# Patient Record
Sex: FEMALE | Race: WHITE | NOT HISPANIC OR LATINO | URBAN - METROPOLITAN AREA
[De-identification: names, ages, dates, MRNs, and addresses within clinical notes are randomized per-mention and may not be internally consistent; named-entity substitution may affect disease eponyms.]

---

## 2018-10-11 ENCOUNTER — EMERGENCY (EMERGENCY)
Facility: HOSPITAL | Age: 63
LOS: 1 days | Discharge: HOME | End: 2018-10-11
Attending: EMERGENCY MEDICINE | Admitting: EMERGENCY MEDICINE
Payer: COMMERCIAL

## 2018-10-11 VITALS
OXYGEN SATURATION: 100 % | SYSTOLIC BLOOD PRESSURE: 120 MMHG | HEART RATE: 73 BPM | RESPIRATION RATE: 16 BRPM | TEMPERATURE: 99 F | DIASTOLIC BLOOD PRESSURE: 64 MMHG

## 2018-10-11 VITALS
HEART RATE: 83 BPM | RESPIRATION RATE: 18 BRPM | OXYGEN SATURATION: 100 % | TEMPERATURE: 99 F | SYSTOLIC BLOOD PRESSURE: 135 MMHG | DIASTOLIC BLOOD PRESSURE: 77 MMHG

## 2018-10-11 DIAGNOSIS — H26.9 UNSPECIFIED CATARACT: Chronic | ICD-10-CM

## 2018-10-11 LAB
ALBUMIN SERPL ELPH-MCNC: 4.4 G/DL — SIGNIFICANT CHANGE UP (ref 3.3–5)
ALP SERPL-CCNC: 59 U/L — SIGNIFICANT CHANGE UP (ref 40–120)
ALT FLD-CCNC: 16 U/L — SIGNIFICANT CHANGE UP (ref 4–33)
APPEARANCE UR: CLEAR — SIGNIFICANT CHANGE UP
AST SERPL-CCNC: 19 U/L — SIGNIFICANT CHANGE UP (ref 4–32)
BACTERIA # UR AUTO: NEGATIVE — SIGNIFICANT CHANGE UP
BASOPHILS # BLD AUTO: 0.04 K/UL — SIGNIFICANT CHANGE UP (ref 0–0.2)
BASOPHILS NFR BLD AUTO: 0.5 % — SIGNIFICANT CHANGE UP (ref 0–2)
BILIRUB SERPL-MCNC: 0.8 MG/DL — SIGNIFICANT CHANGE UP (ref 0.2–1.2)
BILIRUB UR-MCNC: NEGATIVE — SIGNIFICANT CHANGE UP
BLOOD UR QL VISUAL: HIGH
BUN SERPL-MCNC: 15 MG/DL — SIGNIFICANT CHANGE UP (ref 7–23)
CALCIUM SERPL-MCNC: 8.8 MG/DL — SIGNIFICANT CHANGE UP (ref 8.4–10.5)
CHLORIDE SERPL-SCNC: 97 MMOL/L — LOW (ref 98–107)
CO2 SERPL-SCNC: 23 MMOL/L — SIGNIFICANT CHANGE UP (ref 22–31)
COLOR SPEC: SIGNIFICANT CHANGE UP
CREAT SERPL-MCNC: 1.2 MG/DL — SIGNIFICANT CHANGE UP (ref 0.5–1.3)
EOSINOPHIL # BLD AUTO: 0.1 K/UL — SIGNIFICANT CHANGE UP (ref 0–0.5)
EOSINOPHIL NFR BLD AUTO: 1.2 % — SIGNIFICANT CHANGE UP (ref 0–6)
GLUCOSE SERPL-MCNC: 157 MG/DL — HIGH (ref 70–99)
GLUCOSE UR-MCNC: NEGATIVE — SIGNIFICANT CHANGE UP
HCT VFR BLD CALC: 38.4 % — SIGNIFICANT CHANGE UP (ref 34.5–45)
HGB BLD-MCNC: 13.1 G/DL — SIGNIFICANT CHANGE UP (ref 11.5–15.5)
HYALINE CASTS # UR AUTO: NEGATIVE — SIGNIFICANT CHANGE UP
IMM GRANULOCYTES # BLD AUTO: 0.06 # — SIGNIFICANT CHANGE UP
IMM GRANULOCYTES NFR BLD AUTO: 0.7 % — SIGNIFICANT CHANGE UP (ref 0–1.5)
KETONES UR-MCNC: SIGNIFICANT CHANGE UP
LEUKOCYTE ESTERASE UR-ACNC: NEGATIVE — SIGNIFICANT CHANGE UP
LYMPHOCYTES # BLD AUTO: 1.2 K/UL — SIGNIFICANT CHANGE UP (ref 1–3.3)
LYMPHOCYTES # BLD AUTO: 14.5 % — SIGNIFICANT CHANGE UP (ref 13–44)
MCHC RBC-ENTMCNC: 29.4 PG — SIGNIFICANT CHANGE UP (ref 27–34)
MCHC RBC-ENTMCNC: 34.1 % — SIGNIFICANT CHANGE UP (ref 32–36)
MCV RBC AUTO: 86.1 FL — SIGNIFICANT CHANGE UP (ref 80–100)
MONOCYTES # BLD AUTO: 0.78 K/UL — SIGNIFICANT CHANGE UP (ref 0–0.9)
MONOCYTES NFR BLD AUTO: 9.4 % — SIGNIFICANT CHANGE UP (ref 2–14)
NEUTROPHILS # BLD AUTO: 6.1 K/UL — SIGNIFICANT CHANGE UP (ref 1.8–7.4)
NEUTROPHILS NFR BLD AUTO: 73.7 % — SIGNIFICANT CHANGE UP (ref 43–77)
NITRITE UR-MCNC: NEGATIVE — SIGNIFICANT CHANGE UP
NRBC # FLD: 0 — SIGNIFICANT CHANGE UP
PH UR: 6.5 — SIGNIFICANT CHANGE UP (ref 5–8)
PLATELET # BLD AUTO: 187 K/UL — SIGNIFICANT CHANGE UP (ref 150–400)
PMV BLD: 10.4 FL — SIGNIFICANT CHANGE UP (ref 7–13)
POTASSIUM SERPL-MCNC: 3.8 MMOL/L — SIGNIFICANT CHANGE UP (ref 3.5–5.3)
POTASSIUM SERPL-SCNC: 3.8 MMOL/L — SIGNIFICANT CHANGE UP (ref 3.5–5.3)
PROT SERPL-MCNC: 6.8 G/DL — SIGNIFICANT CHANGE UP (ref 6–8.3)
PROT UR-MCNC: NEGATIVE — SIGNIFICANT CHANGE UP
RBC # BLD: 4.46 M/UL — SIGNIFICANT CHANGE UP (ref 3.8–5.2)
RBC # FLD: 12.8 % — SIGNIFICANT CHANGE UP (ref 10.3–14.5)
RBC CASTS # UR COMP ASSIST: HIGH (ref 0–?)
SODIUM SERPL-SCNC: 136 MMOL/L — SIGNIFICANT CHANGE UP (ref 135–145)
SP GR SPEC: 1.01 — SIGNIFICANT CHANGE UP (ref 1–1.04)
SQUAMOUS # UR AUTO: SIGNIFICANT CHANGE UP
UROBILINOGEN FLD QL: NORMAL — SIGNIFICANT CHANGE UP
WBC # BLD: 8.28 K/UL — SIGNIFICANT CHANGE UP (ref 3.8–10.5)
WBC # FLD AUTO: 8.28 K/UL — SIGNIFICANT CHANGE UP (ref 3.8–10.5)
WBC UR QL: SIGNIFICANT CHANGE UP (ref 0–?)

## 2018-10-11 PROCEDURE — 99284 EMERGENCY DEPT VISIT MOD MDM: CPT | Mod: 25

## 2018-10-11 PROCEDURE — 74176 CT ABD & PELVIS W/O CONTRAST: CPT | Mod: 26

## 2018-10-11 RX ORDER — SODIUM CHLORIDE 9 MG/ML
1000 INJECTION INTRAMUSCULAR; INTRAVENOUS; SUBCUTANEOUS ONCE
Qty: 0 | Refills: 0 | Status: COMPLETED | OUTPATIENT
Start: 2018-10-11 | End: 2018-10-11

## 2018-10-11 RX ORDER — TAMSULOSIN HYDROCHLORIDE 0.4 MG/1
1 CAPSULE ORAL
Qty: 7 | Refills: 0 | OUTPATIENT
Start: 2018-10-11 | End: 2018-10-17

## 2018-10-11 RX ORDER — IBUPROFEN 200 MG
1 TABLET ORAL
Qty: 28 | Refills: 0 | OUTPATIENT
Start: 2018-10-11 | End: 2018-10-17

## 2018-10-11 RX ORDER — KETOROLAC TROMETHAMINE 30 MG/ML
15 SYRINGE (ML) INJECTION ONCE
Qty: 0 | Refills: 0 | Status: DISCONTINUED | OUTPATIENT
Start: 2018-10-11 | End: 2018-10-11

## 2018-10-11 RX ORDER — ACETAMINOPHEN 500 MG
975 TABLET ORAL ONCE
Qty: 0 | Refills: 0 | Status: COMPLETED | OUTPATIENT
Start: 2018-10-11 | End: 2018-10-11

## 2018-10-11 RX ADMIN — Medication 15 MILLIGRAM(S): at 04:59

## 2018-10-11 RX ADMIN — Medication 975 MILLIGRAM(S): at 08:47

## 2018-10-11 RX ADMIN — SODIUM CHLORIDE 1000 MILLILITER(S): 9 INJECTION INTRAMUSCULAR; INTRAVENOUS; SUBCUTANEOUS at 06:01

## 2018-10-11 RX ADMIN — SODIUM CHLORIDE 1000 MILLILITER(S): 9 INJECTION INTRAMUSCULAR; INTRAVENOUS; SUBCUTANEOUS at 05:00

## 2018-10-11 RX ADMIN — Medication 15 MILLIGRAM(S): at 06:01

## 2018-10-11 NOTE — ED ADULT NURSE NOTE - OBJECTIVE STATEMENT
Pt presents to  3, A&Ox4, ambulatory at baseline w/o assistance, pmhx of arthritis, borderline DM, here for evaluation of intermittent left sided flank pain that radiates to LLQ, hematuria noticed x2 this week noticed x4days ago, no hematuria since 2 days ago, no bleeding/ discharge or dysuria at this time. Pt appears a mildly uncomfortable bc of pain, VSS, pt stable. Non tender abdomen and back on palpation. Denies chest pain, shortness of breath, palpitations, diaphoresis, headaches, fevers, dizziness, nausea, vomiting, diarrhea, or urinary symptoms at this time. IV established in left AC with a 20G, labs drawn and sent, call bell in reach, warm blanket provided, bed in lowest position, side rails up x2. Will continue to monitor.

## 2018-10-11 NOTE — ED ADULT NURSE NOTE - CHIEF COMPLAINT QUOTE
Pt st" I noticed blood in urine on Sunday....which went away but returned on Tuesday...I saw a urologist did quick bedside scan saw nothing need to schedule a CT scan I was told I have no infection...but tonight I woke with severe left side flank pain." Pt pleasant smiling affect...yet intermittenly uncomfortable... restless. Denies nausea.

## 2018-10-11 NOTE — ED PROVIDER NOTE - CARE PLAN
Principal Discharge DX:	Nephrolithiasis  Assessment and plan of treatment:	Your diagnosis: nephrolithiasis    Discharge instructions:    1. Please follow-up with your urologist next week at predetermined appointment date and location.    2. Take any prescribed medications as instructed: Take Ibuprofen 600 mg every 6 hours as needed for pain, and take Flomax 0.4mg once a day at night.    3. Others:    4. Be sure to return to the ED if you develop new or worsening symptoms. Specific signs and symptoms to be vigilant of: fever or chills, pain on urination, blood in the urine, cloudy urine, foul-smelling urine, increased frequency of urination, difficulty with urination, back pain, abdominal or pelvic pain.

## 2018-10-11 NOTE — ED PROVIDER NOTE - PROGRESS NOTE DETAILS
PgAlexei matt: patient reassessed, pain improved since coming into the ED. Patient has follow up appointment with her urologist next week. Will d/c on Ibuprofen and Tamsulosin. Return precautions discussed and patient comfortable going home.

## 2018-10-11 NOTE — ED ADULT TRIAGE NOTE - CHIEF COMPLAINT QUOTE
Pt st" I noticed blood in urine on Sunday....which went away but returned on Tuesday...I saw a urologist did quick bedside scan saw nothing need to schedule a CT scan I was told I have no infection...but tonight I woke with severe left side flank pain." Pt appears uncomfortable, restless. Pt st" I noticed blood in urine on Sunday....which went away but returned on Tuesday...I saw a urologist did quick bedside scan saw nothing need to schedule a CT scan I was told I have no infection...but tonight I woke with severe left side flank pain." Pt pleasant smiling affect...yet intermittenly uncomfortable... restless. Denies nausea.

## 2018-10-11 NOTE — ED PROVIDER NOTE - PLAN OF CARE
Your diagnosis: nephrolithiasis    Discharge instructions:    1. Please follow-up with your urologist next week at predetermined appointment date and location.    2. Take any prescribed medications as instructed: Take Ibuprofen 600 mg every 6 hours as needed for pain, and take Flomax 0.4mg once a day at night.    3. Others:    4. Be sure to return to the ED if you develop new or worsening symptoms. Specific signs and symptoms to be vigilant of: fever or chills, pain on urination, blood in the urine, cloudy urine, foul-smelling urine, increased frequency of urination, difficulty with urination, back pain, abdominal or pelvic pain.

## 2018-10-11 NOTE — ED PROVIDER NOTE - MEDICAL DECISION MAKING DETAILS
Flank pain most c/w nephrolithiasis (Other DDx also includes AAA,  infection & masses, hernia, GI surgical pathology)  Plan: 1) LABS/UA/IVF.  2) CT abd/pelvis - stone hunt (for first time) vs US.  3) pain control & anti-emetics as needed.  4) reassess.  5) urologic consultation if necessary.

## 2018-10-11 NOTE — ED PROVIDER NOTE - ATTENDING CONTRIBUTION TO CARE
63 y/o otherwise healthy F with 4 days of hematuria and left flank pain since 12am tonight.  Pt saw her PMD yesterday for hematuria and urinary frequency and subsequently referred to a urologist.  She is scheduled for outpatient CT tomorrow, but tonight after going to bed started to have left back and flank pain.  No fever, chills, abd pain, n/v/c/d, dysuria.  Well appearing, sitting in stretcher, awake and alert, nontoxic.  AF/VSS.  Lungs cta bl.  Cards nl S1/S2, RRR, no MRG.  Abd soft ntnd, (+)L CVA tenderness.  No pedal edema or calf tenderness.   Plan for labs, ua, ct abd/pel, ivf, pain meds, reassess.

## 2018-10-11 NOTE — ED PROVIDER NOTE - OBJECTIVE STATEMENT
62y F hx arthritis p/w L flank pain.  Pt was in usual state of good health until 4d ago when she had mild flank pain & hematuria x1.  Pt was otherwise well until 2d ago when she had another single episode of hematuria.  Pt saw her PCP & a urologist Dr Robbins yesterday & is scheduled for an outpt CT scan.  However, today pt had significant flank pain & felt she could not wait until her outpt appointment.  No urinary sx, fever, chills, n/v/d, abd pain.

## 2018-10-12 LAB
BACTERIA UR CULT: SIGNIFICANT CHANGE UP
SPECIMEN SOURCE: SIGNIFICANT CHANGE UP

## 2018-10-13 NOTE — ED POST DISCHARGE NOTE - RESULT SUMMARY
UCX gram negative rods <10,000, however pt was diagnosed w/ a kidney stone. Called pt to see how she if feeling and assess for systemic sx i.e. fever as well as urinary sx. [phone] and [phone], left msg to call back ER. Also spoke w/ son at [phone] who provided an alternative number

## 2024-01-10 PROBLEM — M19.90 UNSPECIFIED OSTEOARTHRITIS, UNSPECIFIED SITE: Chronic | Status: ACTIVE | Noted: 2018-10-11

## 2024-01-18 ENCOUNTER — APPOINTMENT (OUTPATIENT)
Dept: ORTHOPEDIC SURGERY | Facility: CLINIC | Age: 69
End: 2024-01-18
Payer: COMMERCIAL

## 2024-01-18 DIAGNOSIS — M19.90 UNSPECIFIED OSTEOARTHRITIS, UNSPECIFIED SITE: ICD-10-CM

## 2024-01-18 DIAGNOSIS — M18.11 UNILATERAL PRIMARY OSTEOARTHRITIS OF FIRST CARPOMETACARPAL JOINT, RIGHT HAND: ICD-10-CM

## 2024-01-18 DIAGNOSIS — M65.331 TRIGGER FINGER, RIGHT MIDDLE FINGER: ICD-10-CM

## 2024-01-18 DIAGNOSIS — E78.00 PURE HYPERCHOLESTEROLEMIA, UNSPECIFIED: ICD-10-CM

## 2024-01-18 DIAGNOSIS — I10 ESSENTIAL (PRIMARY) HYPERTENSION: ICD-10-CM

## 2024-01-18 DIAGNOSIS — Z86.79 PERSONAL HISTORY OF OTHER DISEASES OF THE CIRCULATORY SYSTEM: ICD-10-CM

## 2024-01-18 PROBLEM — Z00.00 ENCOUNTER FOR PREVENTIVE HEALTH EXAMINATION: Status: ACTIVE | Noted: 2024-01-18

## 2024-01-18 PROCEDURE — 73130 X-RAY EXAM OF HAND: CPT | Mod: RT

## 2024-01-18 PROCEDURE — 20550 NJX 1 TENDON SHEATH/LIGAMENT: CPT

## 2024-01-18 PROCEDURE — 99204 OFFICE O/P NEW MOD 45 MIN: CPT | Mod: 25

## 2024-01-29 ENCOUNTER — APPOINTMENT (OUTPATIENT)
Dept: ORTHOPEDIC SURGERY | Facility: CLINIC | Age: 69
End: 2024-01-29

## 2024-01-29 NOTE — PROCEDURE
[Tendon Sheath] : tendon sheath [Right] : of the right [3rd] : 3rd trigger finger [Pain] : pain [Inflammation] : inflammation [Alcohol] : alcohol [Betadine] : betadine [Ethyl Chloride sprayed topically] : ethyl chloride sprayed topically [] : Patient tolerated procedure well [Call if redness, pain or fever occur] : call if redness, pain or fever occur [Apply ice for 15min out of every hour for the next 12-24 hours as tolerated] : apply ice for 15 minutes out of every hour for the next 12-24 hours as tolerated [Previous OTC use and PT nontherapeutic] : patient has tried OTC's including aspirin, Ibuprofen, Aleve, etc or prescription NSAIDS, and/or exercises at home and/or physical therapy without satisfactory response [Patient had decreased mobility in the joint] : patient had decreased mobility in the joint [Risks, benefits, alternatives discussed / Verbal consent obtained] : the risks benefits, and alternatives have been discussed, and verbal consent was obtained [FreeTextEntry1] : Corticosteroid injection [FreeTextEntry2] : Right middle trigger finger [FreeTextEntry3] : The risks and benefits of steroid injections were discussed. Verbal consent was obtained,  The site was prepped in a sterile fashion with alcohol A combination of 40 mg (1cc) of Kenalog and 2cc 1% xylocaine were injected under sterile conditions at the level of the right middle finger A1 pulley Patient tolerated the procedure without complication and was counseled on post injection expectations.

## 2024-01-29 NOTE — IMAGING
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

## 2024-01-29 NOTE — HISTORY OF PRESENT ILLNESS
[de-identified] : 01/18/2024 HUMZA BAUMAN is here for Location: Rt thumb and middle finger Complaint: The patient presents the office today for evaluation of right thumb pain and middle finger triggering.  She notes several months of the middle finger clicking now requiring the use of the other hand to occasionally unlock it.  No injuries that she can recall.  She has never had any CSI's.  She also notes pain at the base of the thumb associated with activities including key pinch and tight .  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 Onset: 7/01/2023 Prior treatments: Brace Hand dominance: Left Occupation: college assistant

## 2024-01-29 NOTE — ASSESSMENT
[FreeTextEntry1] : We reviewed the pathology and natural history. Her questions were answered and her options outlined. Injections vs surgery discussed. RBAC to each reviewed. Bracing outlined, and she has a brace at home. She will revisit her medication options with her cardiologist. Injection to the R 3rd A1 pulley administered under aseptic technique and after verbal consent.  Patient seen by Dr. Anya Jane. Patient seen by and progress note completed by Sintia Cheatham PAC.

## 2025-06-26 NOTE — ED ADULT TRIAGE NOTE - PAIN RATING/NUMBER SCALE (0-10): ACTIVITY
Confirmed script with pharmacist.  Provider message on 8/21/24 concerning famotidine:    Gastroesophageal reflux disease, unspecified whether esophagitis present  -     famotidine (PEPCID) 20 MG tablet; Take 1 tablet (20 mg total) by mouth nightly as needed for Heartburn.  Dispense: 60 tablet; Refill: 2       10